# Patient Record
Sex: MALE | Race: WHITE | Employment: UNEMPLOYED | URBAN - METROPOLITAN AREA
[De-identification: names, ages, dates, MRNs, and addresses within clinical notes are randomized per-mention and may not be internally consistent; named-entity substitution may affect disease eponyms.]

---

## 2018-06-11 ENCOUNTER — OFFICE VISIT (OUTPATIENT)
Dept: INTERNAL MEDICINE | Age: 28
End: 2018-06-11
Attending: INTERNAL MEDICINE

## 2018-06-11 ENCOUNTER — OFFICE VISIT (OUTPATIENT)
Dept: INTERNAL MEDICINE | Age: 28
End: 2018-06-11
Attending: PODIATRIST

## 2018-06-11 VITALS
HEIGHT: 72 IN | OXYGEN SATURATION: 97 % | TEMPERATURE: 97.8 F | SYSTOLIC BLOOD PRESSURE: 141 MMHG | BODY MASS INDEX: 32.23 KG/M2 | WEIGHT: 238 LBS | HEART RATE: 105 BPM | DIASTOLIC BLOOD PRESSURE: 85 MMHG

## 2018-06-11 DIAGNOSIS — S92.351D CLOSED DISPLACED FRACTURE OF FIFTH METATARSAL BONE OF RIGHT FOOT WITH ROUTINE HEALING: Primary | ICD-10-CM

## 2018-06-11 DIAGNOSIS — S92.351D CLOSED DISPLACED FRACTURE OF FIFTH METATARSAL BONE OF RIGHT FOOT WITH ROUTINE HEALING: ICD-10-CM

## 2018-06-11 DIAGNOSIS — E78.5 DYSLIPIDEMIA: ICD-10-CM

## 2018-06-11 LAB
A/G RATIO: 1.5 (ref 1.1–2.2)
ALBUMIN SERPL-MCNC: 5 G/DL (ref 3.4–5)
ALP BLD-CCNC: 89 U/L (ref 40–129)
ALT SERPL-CCNC: 36 U/L (ref 10–40)
AMPHETAMINE SCREEN, URINE: ABNORMAL
ANION GAP SERPL CALCULATED.3IONS-SCNC: 16 MMOL/L (ref 3–16)
APTT: 38.9 SEC (ref 26–36)
AST SERPL-CCNC: 23 U/L (ref 15–37)
BARBITURATE SCREEN URINE: ABNORMAL
BENZODIAZEPINE SCREEN, URINE: POSITIVE
BILIRUB SERPL-MCNC: 0.4 MG/DL (ref 0–1)
BUN BLDV-MCNC: 11 MG/DL (ref 7–20)
CALCIUM SERPL-MCNC: 10 MG/DL (ref 8.3–10.6)
CANNABINOID SCREEN URINE: POSITIVE
CHLORIDE BLD-SCNC: 102 MMOL/L (ref 99–110)
CHOLESTEROL, TOTAL: 216 MG/DL (ref 0–199)
CO2: 24 MMOL/L (ref 21–32)
COCAINE METABOLITE SCREEN URINE: ABNORMAL
CREAT SERPL-MCNC: 0.8 MG/DL (ref 0.9–1.3)
GFR AFRICAN AMERICAN: >60
GFR NON-AFRICAN AMERICAN: >60
GLOBULIN: 3.4 G/DL
GLUCOSE BLD-MCNC: 83 MG/DL (ref 70–99)
HCT VFR BLD CALC: 50 % (ref 40.5–52.5)
HDLC SERPL-MCNC: 36 MG/DL (ref 40–60)
HEMOGLOBIN: 16.8 G/DL (ref 13.5–17.5)
INR BLD: 0.9 (ref 0.86–1.14)
LDL CHOLESTEROL CALCULATED: ABNORMAL MG/DL
LDL CHOLESTEROL DIRECT: 140 MG/DL
Lab: ABNORMAL
MCH RBC QN AUTO: 28.9 PG (ref 26–34)
MCHC RBC AUTO-ENTMCNC: 33.7 G/DL (ref 31–36)
MCV RBC AUTO: 85.8 FL (ref 80–100)
METHADONE SCREEN, URINE: ABNORMAL
OPIATE SCREEN URINE: ABNORMAL
OXYCODONE URINE: POSITIVE
PDW BLD-RTO: 12.7 % (ref 12.4–15.4)
PH UA: 6
PHENCYCLIDINE SCREEN URINE: ABNORMAL
PLATELET # BLD: 228 K/UL (ref 135–450)
PMV BLD AUTO: 10 FL (ref 5–10.5)
POTASSIUM SERPL-SCNC: 4.2 MMOL/L (ref 3.5–5.1)
PROPOXYPHENE SCREEN: ABNORMAL
PROTHROMBIN TIME: 10.3 SEC (ref 9.8–13)
RBC # BLD: 5.83 M/UL (ref 4.2–5.9)
SODIUM BLD-SCNC: 142 MMOL/L (ref 136–145)
TOTAL PROTEIN: 8.4 G/DL (ref 6.4–8.2)
TRIGL SERPL-MCNC: 334 MG/DL (ref 0–150)
VLDLC SERPL CALC-MCNC: ABNORMAL MG/DL
WBC # BLD: 9.8 K/UL (ref 4–11)

## 2018-06-11 RX ORDER — IBUPROFEN 800 MG/1
800 TABLET ORAL EVERY 8 HOURS PRN
Qty: 30 TABLET | Refills: 1 | Status: SHIPPED | OUTPATIENT
Start: 2018-06-11 | End: 2018-09-07

## 2018-06-11 RX ORDER — OXYCODONE HYDROCHLORIDE AND ACETAMINOPHEN 5; 325 MG/1; MG/1
1 TABLET ORAL EVERY 8 HOURS PRN
Qty: 20 TABLET | Refills: 0 | Status: SHIPPED | OUTPATIENT
Start: 2018-06-11 | End: 2018-06-15 | Stop reason: HOSPADM

## 2018-06-11 ASSESSMENT — ENCOUNTER SYMPTOMS
RESPIRATORY NEGATIVE: 1
EYES NEGATIVE: 1
GASTROINTESTINAL NEGATIVE: 1

## 2018-06-14 RX ORDER — SODIUM CHLORIDE, SODIUM LACTATE, POTASSIUM CHLORIDE, CALCIUM CHLORIDE 600; 310; 30; 20 MG/100ML; MG/100ML; MG/100ML; MG/100ML
INJECTION, SOLUTION INTRAVENOUS CONTINUOUS
Status: CANCELLED | OUTPATIENT
Start: 2018-06-14

## 2018-06-15 ENCOUNTER — HOSPITAL ENCOUNTER (OUTPATIENT)
Dept: SURGERY | Age: 28
Discharge: HOME | End: 2018-06-15
Attending: PODIATRIST | Admitting: PODIATRIST

## 2018-06-15 VITALS
BODY MASS INDEX: 31.14 KG/M2 | TEMPERATURE: 97.6 F | DIASTOLIC BLOOD PRESSURE: 74 MMHG | WEIGHT: 235 LBS | OXYGEN SATURATION: 98 % | HEART RATE: 82 BPM | RESPIRATION RATE: 16 BRPM | HEIGHT: 73 IN | SYSTOLIC BLOOD PRESSURE: 112 MMHG

## 2018-06-15 DIAGNOSIS — G89.18 POST-OPERATIVE PAIN: Primary | ICD-10-CM

## 2018-06-15 RX ORDER — MIDAZOLAM HYDROCHLORIDE 1 MG/ML
INJECTION INTRAMUSCULAR; INTRAVENOUS
Status: COMPLETED
Start: 2018-06-15 | End: 2018-06-15

## 2018-06-15 RX ORDER — MIDAZOLAM HYDROCHLORIDE 1 MG/ML
2 INJECTION INTRAMUSCULAR; INTRAVENOUS ONCE
Status: COMPLETED | OUTPATIENT
Start: 2018-06-15 | End: 2018-06-15

## 2018-06-15 RX ORDER — FENTANYL CITRATE 50 UG/ML
100 INJECTION, SOLUTION INTRAMUSCULAR; INTRAVENOUS ONCE
Status: COMPLETED | OUTPATIENT
Start: 2018-06-15 | End: 2018-06-15

## 2018-06-15 RX ORDER — ROPIVACAINE HYDROCHLORIDE 5 MG/ML
30 INJECTION, SOLUTION EPIDURAL; INFILTRATION; PERINEURAL ONCE
Status: DISCONTINUED | OUTPATIENT
Start: 2018-06-15 | End: 2018-06-16 | Stop reason: HOSPADM

## 2018-06-15 RX ORDER — OXYCODONE HYDROCHLORIDE AND ACETAMINOPHEN 5; 325 MG/1; MG/1
1 TABLET ORAL EVERY 4 HOURS PRN
Qty: 42 TABLET | Refills: 0 | Status: SHIPPED | OUTPATIENT
Start: 2018-06-15 | End: 2018-06-22

## 2018-06-15 RX ORDER — ASPIRIN 325 MG
325 TABLET ORAL DAILY
Qty: 14 TABLET | Refills: 0 | Status: SHIPPED | OUTPATIENT
Start: 2018-06-15 | End: 2018-06-19 | Stop reason: SDUPTHER

## 2018-06-15 RX ORDER — FENTANYL CITRATE 50 UG/ML
INJECTION, SOLUTION INTRAMUSCULAR; INTRAVENOUS
Status: COMPLETED
Start: 2018-06-15 | End: 2018-06-15

## 2018-06-15 RX ORDER — OXYCODONE HYDROCHLORIDE AND ACETAMINOPHEN 5; 325 MG/1; MG/1
1 TABLET ORAL ONCE
Status: DISCONTINUED | OUTPATIENT
Start: 2018-06-15 | End: 2018-06-16 | Stop reason: HOSPADM

## 2018-06-15 RX ORDER — FENTANYL CITRATE 50 UG/ML
100 INJECTION, SOLUTION INTRAMUSCULAR; INTRAVENOUS ONCE
Status: DISCONTINUED | OUTPATIENT
Start: 2018-06-15 | End: 2018-06-16 | Stop reason: HOSPADM

## 2018-06-15 RX ORDER — SODIUM CHLORIDE, SODIUM LACTATE, POTASSIUM CHLORIDE, CALCIUM CHLORIDE 600; 310; 30; 20 MG/100ML; MG/100ML; MG/100ML; MG/100ML
INJECTION, SOLUTION INTRAVENOUS CONTINUOUS
Status: DISCONTINUED | OUTPATIENT
Start: 2018-06-15 | End: 2018-06-16 | Stop reason: HOSPADM

## 2018-06-15 RX ORDER — ROPIVACAINE HYDROCHLORIDE 5 MG/ML
INJECTION, SOLUTION EPIDURAL; INFILTRATION; PERINEURAL
Status: DISCONTINUED
Start: 2018-06-15 | End: 2018-06-16 | Stop reason: HOSPADM

## 2018-06-15 RX ADMIN — FENTANYL CITRATE 100 MCG: 50 INJECTION, SOLUTION INTRAMUSCULAR; INTRAVENOUS at 13:16

## 2018-06-15 RX ADMIN — MIDAZOLAM HYDROCHLORIDE 2 MG: 1 INJECTION INTRAMUSCULAR; INTRAVENOUS at 13:19

## 2018-06-15 RX ADMIN — SODIUM CHLORIDE, SODIUM LACTATE, POTASSIUM CHLORIDE, CALCIUM CHLORIDE: 600; 310; 30; 20 INJECTION, SOLUTION INTRAVENOUS at 12:07

## 2018-06-15 RX ADMIN — MIDAZOLAM HYDROCHLORIDE 2 MG: 1 INJECTION INTRAMUSCULAR; INTRAVENOUS at 13:17

## 2018-06-15 RX ADMIN — FENTANYL CITRATE 50 MCG: 50 INJECTION, SOLUTION INTRAMUSCULAR; INTRAVENOUS at 12:18

## 2018-06-15 ASSESSMENT — PAIN DESCRIPTION - DESCRIPTORS: DESCRIPTORS: ACHING;CRUSHING;DISCOMFORT;HEAVINESS

## 2018-06-15 ASSESSMENT — PAIN SCALES - GENERAL
PAINLEVEL_OUTOF10: 0
PAINLEVEL_OUTOF10: 6
PAINLEVEL_OUTOF10: 5

## 2018-06-16 RX ORDER — PROMETHAZINE HYDROCHLORIDE 25 MG/1
25 TABLET ORAL EVERY 6 HOURS PRN
Qty: 42 TABLET | Refills: 0 | OUTPATIENT
Start: 2018-06-16 | End: 2018-09-07

## 2018-06-19 ENCOUNTER — OFFICE VISIT (OUTPATIENT)
Dept: INTERNAL MEDICINE | Age: 28
End: 2018-06-19
Attending: PODIATRIST

## 2018-06-19 DIAGNOSIS — S92.355S CLOSED NONDISPLACED FRACTURE OF FIFTH METATARSAL BONE OF LEFT FOOT, SEQUELA: Primary | ICD-10-CM

## 2018-06-19 RX ORDER — ASPIRIN 325 MG
325 TABLET ORAL DAILY
Qty: 14 TABLET | Refills: 0 | Status: SHIPPED | OUTPATIENT
Start: 2018-06-19 | End: 2018-09-07

## 2018-06-20 ENCOUNTER — TELEPHONE (OUTPATIENT)
Dept: INTERNAL MEDICINE | Age: 28
End: 2018-06-20

## 2018-06-22 ENCOUNTER — OFFICE VISIT (OUTPATIENT)
Dept: INTERNAL MEDICINE | Age: 28
End: 2018-06-22
Attending: PODIATRIST

## 2018-06-22 DIAGNOSIS — M79.673 PAIN OF FOOT, UNSPECIFIED LATERALITY: Primary | ICD-10-CM

## 2018-07-13 ENCOUNTER — OFFICE VISIT (OUTPATIENT)
Dept: INTERNAL MEDICINE | Age: 28
End: 2018-07-13
Attending: PODIATRIST

## 2018-07-13 DIAGNOSIS — S92.351D CLOSED DISPLACED FRACTURE OF FIFTH METATARSAL BONE OF RIGHT FOOT WITH ROUTINE HEALING: Primary | ICD-10-CM

## 2018-07-20 ENCOUNTER — OFFICE VISIT (OUTPATIENT)
Dept: INTERNAL MEDICINE CLINIC | Age: 28
End: 2018-07-20
Payer: MEDICARE

## 2018-07-20 ENCOUNTER — HOSPITAL ENCOUNTER (OUTPATIENT)
Age: 28
Discharge: HOME | End: 2018-07-20
Payer: MEDICARE

## 2018-07-20 ENCOUNTER — HOSPITAL ENCOUNTER (OUTPATIENT)
Dept: GENERAL RADIOLOGY | Age: 28
Discharge: HOME | End: 2018-07-20
Payer: MEDICARE

## 2018-07-20 DIAGNOSIS — S92.351D CLOSED DISPLACED FRACTURE OF FIFTH METATARSAL BONE OF RIGHT FOOT WITH ROUTINE HEALING: Primary | ICD-10-CM

## 2018-07-20 DIAGNOSIS — S92.355S CLOSED NONDISPLACED FRACTURE OF FIFTH METATARSAL BONE OF LEFT FOOT, SEQUELA: ICD-10-CM

## 2018-07-20 PROCEDURE — 73630 X-RAY EXAM OF FOOT: CPT

## 2018-07-20 PROCEDURE — 99213 OFFICE O/P EST LOW 20 MIN: CPT | Performed by: STUDENT IN AN ORGANIZED HEALTH CARE EDUCATION/TRAINING PROGRAM

## 2018-07-20 NOTE — PROGRESS NOTES
the next week, followed by full weightbearing in the CAM boot. Explained to patient that he may continue using the crutches for extra support/balance.    - Patient to RTC in 2 weeks    Chery Heaton PGY1  Pager [phone]

## 2018-08-03 ENCOUNTER — OFFICE VISIT (OUTPATIENT)
Dept: INTERNAL MEDICINE CLINIC | Age: 28
End: 2018-08-03
Payer: COMMERCIAL

## 2018-08-03 DIAGNOSIS — S92.351D CLOSED DISPLACED FRACTURE OF FIFTH METATARSAL BONE OF RIGHT FOOT WITH ROUTINE HEALING: Primary | ICD-10-CM

## 2018-08-03 PROCEDURE — 99213 OFFICE O/P EST LOW 20 MIN: CPT | Performed by: STUDENT IN AN ORGANIZED HEALTH CARE EDUCATION/TRAINING PROGRAM

## 2018-08-03 NOTE — PROGRESS NOTES
Department of Podiatry  Resident Progress Note    Hattie Murphy  Allergies: Patient has no known allergies. SUBJECTIVE  The patient is a 24 y.o. male who presents with s/p ORIF 5th met fx of right foot (DOS 6/15). Patient states he has been transitioning to partial weight bearing in his CAM boot. Patient states that after his last appointment 2 weeks ago, he continued to use the crutches for 1 week, and then transitioned to partial heel weight bearing over the next week. Patient admits to putting full weight on it for the past couple days with minimal pain. Patient has been doing passive range of motion exercises every night since his last appointment. Patient states he received x-rays prior to his appointment today. Patient denies f/c/n/v/sob/cp and denies any calf pain. Past Medical History:        Diagnosis Date    Hyperlipidemia        REVIEW OF SYSTEMS:  All 10 point ROS negative unless stated otherwise. OBJECTIVE: RIGHT FOOT FOCUSED EXAM   General: Patient is alert and oriented x3. No signs of distress  noted. Vascular: DP/PT pulses palpable. CRF within normal limits. Moderate swelling present. Skin temp warm to cool from proximal to distal within normal limits. No varicosities present. Neurological: Gross light touch sensation appreciated and intact at all pedal sites    Dermatologic: Skin intact with no ecchymosis. No open lesions, drainage, erythema, or signs of acute infection. Incision site intact with no dehiscence. Musculoskeletal: Muscle strength 5/5. Mild POP along incision site and distal to incision. Mild pain with active eversion and dorsiflexion on the right     IMAGING     X-RAY, Right foot 7/20/18:   Impression       Status post ORIF of the fifth metatarsal base avulsion type   fracture. Partial osseous union at the fracture site.        ASSESSMENT  1.s/p 5th met fracture, right    PLAN  - Evaluation and management x 15 minutes and greater than 50% of the time

## 2018-08-17 ENCOUNTER — OFFICE VISIT (OUTPATIENT)
Dept: INTERNAL MEDICINE CLINIC | Age: 28
End: 2018-08-17
Payer: MEDICARE

## 2018-08-17 ENCOUNTER — HOSPITAL ENCOUNTER (OUTPATIENT)
Dept: GENERAL RADIOLOGY | Age: 28
Discharge: HOME | End: 2018-08-17
Payer: MEDICARE

## 2018-08-17 ENCOUNTER — HOSPITAL ENCOUNTER (OUTPATIENT)
Age: 28
Discharge: HOME | End: 2018-08-17
Payer: MEDICARE

## 2018-08-17 DIAGNOSIS — S92.351D CLOSED DISPLACED FRACTURE OF FIFTH METATARSAL BONE OF RIGHT FOOT WITH ROUTINE HEALING: Primary | ICD-10-CM

## 2018-08-17 DIAGNOSIS — S92.351D CLOSED DISPLACED FRACTURE OF FIFTH METATARSAL BONE OF RIGHT FOOT WITH ROUTINE HEALING: ICD-10-CM

## 2018-08-17 PROCEDURE — 99213 OFFICE O/P EST LOW 20 MIN: CPT | Performed by: PODIATRIST

## 2018-08-17 PROCEDURE — 73630 X-RAY EXAM OF FOOT: CPT

## 2018-08-17 NOTE — PROGRESS NOTES
prior to next visit. Instructed patient to get xrays prior to his next visit. - Instructed patient to remain in the CAM walker full weightbearing   - Patient to continue with ROM exercises   - Patient to RTC in 2 weeks at which point we will transition into full weight bearing in tennis shoe.      Daryel Duane, PGY-3  Pager: [phone]

## 2018-08-31 ENCOUNTER — HOSPITAL ENCOUNTER (OUTPATIENT)
Age: 28
Discharge: HOME | End: 2018-08-31
Payer: MEDICARE

## 2018-08-31 ENCOUNTER — OFFICE VISIT (OUTPATIENT)
Dept: INTERNAL MEDICINE CLINIC | Age: 28
End: 2018-08-31
Payer: MEDICARE

## 2018-08-31 ENCOUNTER — HOSPITAL ENCOUNTER (OUTPATIENT)
Dept: GENERAL RADIOLOGY | Age: 28
Discharge: HOME | End: 2018-08-31
Payer: MEDICARE

## 2018-08-31 DIAGNOSIS — S92.351D CLOSED DISPLACED FRACTURE OF FIFTH METATARSAL BONE OF RIGHT FOOT WITH ROUTINE HEALING: Primary | ICD-10-CM

## 2018-08-31 DIAGNOSIS — S92.351D CLOSED DISPLACED FRACTURE OF FIFTH METATARSAL BONE OF RIGHT FOOT WITH ROUTINE HEALING: ICD-10-CM

## 2018-08-31 PROCEDURE — 73630 X-RAY EXAM OF FOOT: CPT

## 2018-08-31 PROCEDURE — 99213 OFFICE O/P EST LOW 20 MIN: CPT | Performed by: STUDENT IN AN ORGANIZED HEALTH CARE EDUCATION/TRAINING PROGRAM

## 2018-08-31 RX ORDER — MELOXICAM 7.5 MG/1
7.5 TABLET ORAL DAILY
Qty: 14 TABLET | Refills: 0 | Status: SHIPPED | OUTPATIENT
Start: 2018-08-31 | End: 2018-09-07

## 2018-08-31 NOTE — PATIENT INSTRUCTIONS
asthma attack or severe allergic reaction after taking aspirin or an NSAID. To make sure meloxicam is safe for you, tell your doctor if you have:  · heart disease, high blood pressure, high cholesterol, diabetes, or if you smoke;  · a history of heart attack, stroke, or blood clot;  · a history of stomach ulcers or bleeding;  · asthma;  · kidney disease (or if you are on dialysis);  · liver disease; or  · fluid retention. Taking meloxicam during the last 3 months of pregnancy may harm the unborn baby. Tell your doctor if you are pregnant or plan to become pregnant. Meloxicam may cause a delay in ovulation (the release of an egg from an ovary). You should not take meloxicam if you are undergoing fertility treatment, or are otherwise trying to get pregnant. Meloxicam can pass into breast milk and may harm a nursing baby. You should not breast-feed while using this medicine. Meloxicam is not approved for use by anyone younger than 3years old. How should I take meloxicam?  Follow all directions on your prescription label. Your doctor may occasionally change your dose to make sure you get the best results. Do not take this medicine in larger amounts or for longer than recommended. Use the lowest dose that is effective in treating your condition. You may take meloxicam with or without food. Shake the oral suspension (liquid) well just before you measure a dose. Measure liquid medicine with the dosing syringe provided, or with a special dose-measuring spoon or medicine cup. If you do not have a dose-measuring device, ask your pharmacist for one. If a child is taking this medication, tell your doctor if the child has any changes in weight. Meloxicam doses are based on weight in children. If you use this medicine long-term, you may need frequent medical tests. Store at room temperature, away from moisture and heat. Keep the bottle tightly closed when not in use.   Read all patient information, medication guides, and instruction sheets provided to you. Ask your doctor or pharmacist if you have any questions. What happens if I miss a dose? Take the missed dose as soon as you remember. Skip the missed dose if it is almost time for your next scheduled dose. Do not take extra medicine to make up the missed dose. What happens if I overdose? Seek emergency medical attention or call the Poison Help line at 1-958.886.4671. What should I avoid while taking meloxicam?  Avoid drinking alcohol. It may increase your risk of stomach bleeding. Avoid taking aspirin while you are taking meloxicam.  Ask a doctor or pharmacist before using any cold, allergy, or pain medication. Many medicines available over the counter contain aspirin or other medicines similar to meloxicam. Taking certain products together can cause you to get too much of this type of medication. Check the label to see if a medicine contains aspirin, ibuprofen, ketoprofen, or naproxen. What are the possible side effects of meloxicam?  Get emergency medical help if you have signs of an allergic reaction: sneezing, runny or stuffy nose; wheezing or trouble breathing; hives; swelling of your face, lips, tongue, or throat. Get emergency medical help if you have signs of a heart attack or stroke: chest pain spreading to your jaw or shoulder, sudden numbness or weakness on one side of the body, slurred speech, feeling short of breath. Stop using meloxicam and call your doctor at once if you have:  · the first sign of any skin rash, no matter how mild;  · shortness of breath (even with mild exertion);   · swelling or rapid weight gain;  · signs of stomach bleeding --bloody or tarry stools, coughing up blood or vomit that looks like coffee grounds;  · liver problems --nausea, upper stomach pain, itching, tired feeling, flu-like symptoms, loss of appetite, dark urine, dionisio-colored stools, jaundice (yellowing of the skin or eyes);  · kidney problems --little or no urinating, painful or difficult urination, swelling in your feet or ankles, feeling tired or short of breath;  · low red blood cells (anemia) --pale skin, feeling light-headed or short of breath, rapid heart rate, trouble concentrating; or  · severe skin reaction --fever, sore throat, swelling in your face or tongue, burning in your eyes, skin pain followed by a red or purple skin rash that spreads (especially in the face or upper body) and causes blistering and peeling. Common side effects may include:  · upset stomach, nausea, vomiting, heartburn;  · diarrhea, constipation, gas;  · dizziness; or  · cold symptoms, flu symptoms. This is not a complete list of side effects and others may occur. Call your doctor for medical advice about side effects. You may report side effects to FDA at 1-976-FDA-9804. What other drugs will affect meloxicam?  Ask your doctor before using meloxicam if you take an antidepressant such as citalopram, escitalopram, fluoxetine (Prozac), fluvoxamine, paroxetine, sertraline (Zoloft), trazodone, or vilazodone. Taking any of these medicines with an NSAID may cause you to bruise or bleed easily. Tell your doctor about all your current medicines and any you start or stop using, especially:  · cyclosporine;  · lithium;  · methotrexate;  · sodium polystyrene sulfonate (Kayexalate);  · a blood thinner (warfarin, Coumadin, Jantoven);  · heart or blood pressure medication, including a diuretic or \"water pill\"; or  · steroid medicine (such as prednisone). This list is not complete. Other drugs may interact with meloxicam, including prescription and over-the-counter medicines, vitamins, and herbal products. Not all possible interactions are listed in this medication guide. Where can I get more information?   Your pharmacist can provide more information about meloxicam.    Remember, keep this and all other medicines out of the reach of children, never share your medicines with others, and use this medication

## 2018-08-31 NOTE — PROGRESS NOTES
OUTPATIENT SPECIALTY PODIATRY VISIT      Nursing Progress Note      August 31, 2018  Karthikeyan Morris    Patient description of the problem: follow up on xray . Having pain . He is back in a cam boot. States had xray.     Observations: as above    Ambulates: without assistance    Gait: Normal     Assistive Devices: none    Fall History: No    Foot Hygiene: good    Foot Wear Proper: Yes    Impaired Skin Integrity: No     Pain Assessment:  Pain Present: yes  Pain Score: can go up to level 10 at times/10  Pain Quality/Description: Burning and Throbbing  Pain Onset: 1 weeks ago  Pain Goal of patient: 0/10    Education Assessment:    Identify the learner who is being assessed for education:  patient                    Ability to Learn:  Exhibits ability to grasp concepts and respond to questions: Medium  Ready to Learn: Yes  calm   Preferred Method of Learning:  written  Barriers to Learning: Verbalizes interest  Special Considerations due to cultural, Tenriism, spiritual beliefs:  No  Language:  English  :  No    Lito Douglas Page  1:19 PM 8/31/2018            2
with patient. Impressions states above. - Explained to patient that the screw/fracture are at an area of muscle and ligamentous attachments and that the firing of those muscles while ambulating may be causing his pain and discomfort. Also explained to patient that since he has been non-weight bearing and partial weight bearing for multiple months, his leg muscles have atrophied compared to the left and are weaker, which may be causing him as well. Offered to send patient to physical therapy and encouraged it since he denied it at a previous appointment.  Patient agreed to physical therapy  - Consult for physical therapy   - Instructed patient to continue wearing the CAM boot until he sees physical therapy and to f/u with their recommendations based on his first session   - Patient to continue with ROM exercises   - Patient to RTC in 2 weeks     Rocael Vega PGY1  Pager [phone]

## 2018-09-07 ENCOUNTER — HOSPITAL ENCOUNTER (EMERGENCY)
Age: 28
Discharge: HOME | End: 2018-09-07
Attending: EMERGENCY MEDICINE
Payer: MEDICARE

## 2018-09-07 VITALS
HEART RATE: 88 BPM | HEIGHT: 72 IN | DIASTOLIC BLOOD PRESSURE: 86 MMHG | RESPIRATION RATE: 16 BRPM | SYSTOLIC BLOOD PRESSURE: 142 MMHG | BODY MASS INDEX: 31.8 KG/M2 | TEMPERATURE: 97.9 F | WEIGHT: 234.8 LBS

## 2018-09-07 DIAGNOSIS — J20.9 ACUTE BRONCHITIS, UNSPECIFIED ORGANISM: Primary | ICD-10-CM

## 2018-09-07 DIAGNOSIS — R03.0 ELEVATED BLOOD PRESSURE READING: ICD-10-CM

## 2018-09-07 DIAGNOSIS — R05.9 COUGH: ICD-10-CM

## 2018-09-07 PROCEDURE — 99282 EMERGENCY DEPT VISIT SF MDM: CPT

## 2018-09-07 RX ORDER — BENZONATATE 200 MG/1
200 CAPSULE ORAL 3 TIMES DAILY PRN
Qty: 21 CAPSULE | Refills: 1 | Status: SHIPPED | OUTPATIENT
Start: 2018-09-07 | End: 2019-01-07 | Stop reason: ALTCHOICE

## 2018-09-07 RX ORDER — AZITHROMYCIN 250 MG/1
TABLET, FILM COATED ORAL
Qty: 6 TABLET | Refills: 0 | Status: SHIPPED | OUTPATIENT
Start: 2018-09-07 | End: 2018-09-17

## 2018-09-12 ENCOUNTER — HOSPITAL ENCOUNTER (OUTPATIENT)
Dept: PHYSICAL THERAPY | Age: 28
Setting detail: THERAPIES SERIES
Discharge: HOME | End: 2018-09-12
Payer: MEDICARE

## 2018-09-12 PROCEDURE — 97110 THERAPEUTIC EXERCISES: CPT

## 2018-09-12 PROCEDURE — G8979 MOBILITY GOAL STATUS: HCPCS

## 2018-09-12 PROCEDURE — G8978 MOBILITY CURRENT STATUS: HCPCS

## 2018-09-12 PROCEDURE — 97161 PT EVAL LOW COMPLEX 20 MIN: CPT

## 2018-09-12 NOTE — FLOWSHEET NOTE
family visits. Pt currently driving, pt weaning off boot has been out of boot for about 2 weeks, pt reports weaned off himself, did not use crufches and has not used crutches. Pt reports having help as needed at home, reports stairs flight of stairs in home to access all needs. Pt reports inc difficulty wiht stairs as well. Objective:  Observation:   Test measurements:  AROM long sitting in Right foot DF 5 degrees with pain and tight end feel, PF 40 degrees with tight end feel, Eversion 20 degrees, Inversion wiht pain 10 degrees, empty end feel. Great toe extension and flexion is limited due to weakness. MMT 2+/5 ankle, knee fair plus to good/normal, hip good to normal.     Exercises:  Exercise 1: per HEP in media isometrics, toe curl, self massage, standing gastroc and soleus stretch poor petra completed in sitting with towel, home modalities rec and avoid high impact exercises or activities, gait training use crutch(s) as needed. Exercise/Equipment Resistance/Repetitions Other comments                                                                           Other Therapeutic Activities:      Home Exercise Program:  Exercise 1: per HEP in media isometrics, toe curl, self massage, standing gastroc and soleus stretch poor petra completed in sitting with towel, home modalities rec and avoid high impact exercises or activities, gait training use crutch(s) as needed. Manual Treatments:      Modalities:      Timed Code Treatment Minutes:       Total Treatment Minutes:      Individual   Time In 1135   Time Out 1215 (15 minutes therapueutic exercise, 25 minutes)   Minutes 40       Treatment/Activity Tolerance:  [] Patient tolerated treatment well [] Patient limited by fatigue  [] Patient limited by pain  [] Patient limited by other medical complications  [] Other:     Assessment:       Prognosis: [] Good [] Fair  [] Poor    Patient Requires Follow-up: [] Yes  [] No    Goals:  Short term goals  Time Frame for Short

## 2018-09-12 NOTE — PLAN OF CARE
pain/tenderness/paresthesias, reducing swelling/inflammation/tightness, improving soft tissue extensibility, and/or to increase muscle tone/strength):     [] Ultrasound  [] Electrical Stimulation        [] Cervical Traction [] Lumbar Traction    ? [x] Cold/hotpack [] Iontophoresis   Other:      []          []      Assessment:  Conditions Requiring Skilled Therapeutic Intervention  Body structures, Functions, Activity limitations: Decreased functional mobility , Decreased sensation, Decreased ADL status, Decreased ROM, Decreased balance, Decreased strength, Decreased safe awareness, Decreased high-level IADLs  Assessment: Pt s/p ORIF 5th metatarsal on the right foot after injury 6/15/18.  Pt c/c pain numbness gait difficulty   Treatment Diagnosis: gait difficulty and right foot back 2/2 ORIF of the 5th metatarsal of the right foot  Prognosis: Good  Decision Making: Low Complexity  Patient Education: roles and goals of PT, home modalities, gait training, HEP instruction, POC- pt agrees and all questions answered   REQUIRES PT FOLLOW UP: Yes  Treatment Initiated : YES    Goals:  Short term goals  Time Frame for Short term goals: 3 weeks  Short term goal 1: Pt to improve AROM in ankle to symmetrical painfree range for ease and improvment of gait mechanics  Short term goal 2: Pt to improveRLE strength to good for ease of transfers and gait   Short term goal 3: Pt to report pain at worse 3-4/10, best 0/10 and without inc pain during normal daily routines or HEP  Short term goal 4: Pt to be indep with HEP and home modalities to max rehab potential  Short term goal 5: Pt to dec impairment per LEFS per pt specific outcome quetsions ( omitted 16-19) from 70% to less than  45%   Long term goals  Time Frame for Long term goals : 6 weeks  Long term goal 1: Pt to report return to normal routines and with less impairment per LEFS pt specific questions less than 20%     Frequency/Duration: Pt wishing to f/u with PT visits in 2-3

## 2018-09-12 NOTE — PROGRESS NOTES
degrees with tight end feel, Eversion 20 degrees, Inversion wiht pain 10 degrees, empty end feel. Great toe extension and flexion is limited due to weakness. MMT 2+/5 ankle, knee fair plus to good/normal, hip good to normal.   Sensation  Overall Sensation Status:  (reports numbness from incision to the end of the pinky toe)  Bed mobility  Bridging: Independent  Rolling to Left: Independent  Rolling to Right: Independent  Supine to Sit: Independent  Sit to Supine: Independent  Scooting: Independent  Transfers  Sit to Stand: Independent  Stand to sit: Independent  Bed to Chair: Independent  Ambulation  Ambulation?: Yes  Ambulation 1  Surface: level tile  Device: No Device  Assistance: Independent  Quality of Gait: mod to mild antalgic gait, asymmetrical step length and stance time, RLE -dec DF at heel strike, dec heel toe off   Distance: 150ft x2   Balance  Comments: unable to complete modified tandem without pain cruz, unable to attempt due to pain SLS  Exercises  Exercise 1: per HEP in media isometrics, toe curl, self massage, standing gastroc and soleus stretch poor petra completed in sitting with towel, home modalities rec and avoid high impact exercises or activities, gait training use crutch(s) as needed. Assessment   Conditions Requiring Skilled Therapeutic Intervention  Body structures, Functions, Activity limitations: Decreased functional mobility ; Decreased sensation;Decreased ADL status; Decreased ROM; Decreased balance;Decreased strength;Decreased safe awareness;Decreased high-level IADLs  Assessment: Pt s/p ORIF 5th metatarsal on the right foot after injury 6/15/18.  Pt c/c pain numbness gait difficulty   Treatment Diagnosis: gait difficulty and right foot back 2/2 ORIF of the 5th metatarsal of the right foot  Prognosis: Good  Decision Making: Low Complexity  Patient Education: roles and goals of PT, home modalities, gait training, HEP instruction, POC- pt agrees and all questions answered   REQUIRES PT FOLLOW UP: Yes  Treatment Initiated : YES  Activity Tolerance  Activity Tolerance: Patient Tolerated treatment well;Patient limited by pain     Plan   Plan  Times per week: 1x a week for 6 weeks  Specific instructions for Next Treatment: Pt stating only wishing to come every 2-3 weeks as needed, pt reported he will f/u with PT visits. Current Treatment Recommendations: Strengthening, Neuromuscular Re-education, IADL Training, Home Exercise Program, Safety Education & Training, Aquatics, Manual Therapy - Soft Tissue Mobilization, ROM, Patient/Caregiver Education & Training, Balance Training, Functional Mobility Training, Equipment Evaluation, Education, & procurement, Modalities, Transfer Training, ADL/Self-care Training, Stair training, Gait Training, Positioning, Pain Management    G-Code  PT G-Codes  Functional Assessment Tool Used: LEFS  Score: 70% impairment ( pt also verbalizing this being correct and 70% from baseline). Functional Limitation: Mobility: Walking and moving around  Mobility: Walking and Moving Around Current Status (): At least 60 percent but less than 80 percent impaired, limited or restricted  Mobility: Walking and Moving Around Goal Status ():  At least 1 percent but less than 20 percent impaired, limited or restricted    Goals  Short term goals  Time Frame for Short term goals: 3 weeks  Short term goal 1: Pt to improve AROM in ankle to symmetrical painfree range for ease and improvment of gait mechanics  Short term goal 2: Pt to improveRLE strength to good for ease of transfers and gait   Short term goal 3: Pt to report pain at worse 3-4/10, best 0/10 and without inc pain during normal daily routines or HEP  Short term goal 4: Pt to be indep with HEP and home modalities to max rehab potential  Short term goal 5: Pt to dec impairment per LEFS per pt specific outcome quetsions ( omitted 16-19) from 70% to less than  45%   Long term goals  Time Frame for Long term goals : 6 weeks  Long term goal 1: Pt to report return to normal routines and with less impairment per LEFS pt specific questions less than 20%   Patient Goals   Patient goals : \"normal function of my leg\" back to baseline     Therapy Time   Individual   Time In 1135   Time Out 1215 (15 minutes therapueutic exercise, 25 minutes)   Minutes 40     Arun Mendez, PT DPT

## 2018-10-02 NOTE — DISCHARGE SUMMARY
further PT visits after evaluation but has not. Pt was given education over modalities, ROM/strengthening for HEP instruction. Pt now d/c'd due to lack of compliance with rec POC. Rehab Potential: [] Excellent [x] Good [] Fair  [] Poor     Goal Status:  Unable to reassess     Patient Status: [] Continue per initial plan of Care     [x] Patient now discharged     [] Additional visits requested, Please re-certify for additional visits:      Requested frequency/duration:  X/week for weeks    Electronically signed by:  Marlena Mtz PT DPT    If you have any questions or concerns, please don't hesitate to call.   Thank you for your referral.    Physician Signature:________________________________Date:__________________  By signing above, therapists plan is approved by physician

## 2019-01-07 ENCOUNTER — HOSPITAL ENCOUNTER (EMERGENCY)
Age: 29
Discharge: HOME | End: 2019-01-07
Attending: EMERGENCY MEDICINE
Payer: COMMERCIAL

## 2019-01-07 VITALS
HEIGHT: 72 IN | BODY MASS INDEX: 31.26 KG/M2 | HEART RATE: 96 BPM | TEMPERATURE: 98.2 F | RESPIRATION RATE: 16 BRPM | DIASTOLIC BLOOD PRESSURE: 86 MMHG | OXYGEN SATURATION: 97 % | SYSTOLIC BLOOD PRESSURE: 144 MMHG | WEIGHT: 230.8 LBS

## 2019-01-07 DIAGNOSIS — K08.89 DENTALGIA: Primary | ICD-10-CM

## 2019-01-07 DIAGNOSIS — R03.0 ELEVATED BLOOD PRESSURE READING: ICD-10-CM

## 2019-01-07 PROCEDURE — 99282 EMERGENCY DEPT VISIT SF MDM: CPT

## 2019-01-07 RX ORDER — IBUPROFEN 600 MG/1
600 TABLET ORAL EVERY 8 HOURS PRN
Qty: 20 TABLET | Refills: 1 | Status: SHIPPED | OUTPATIENT
Start: 2019-01-07

## 2019-01-07 RX ORDER — PENICILLIN V POTASSIUM 500 MG/1
500 TABLET ORAL 4 TIMES DAILY
Qty: 40 TABLET | Refills: 0 | Status: SHIPPED | OUTPATIENT
Start: 2019-01-07 | End: 2019-01-17

## 2019-01-07 ASSESSMENT — PAIN DESCRIPTION - LOCATION: LOCATION: TEETH

## 2019-01-07 ASSESSMENT — PAIN DESCRIPTION - PAIN TYPE: TYPE: ACUTE PAIN

## 2019-01-07 ASSESSMENT — PAIN DESCRIPTION - FREQUENCY: FREQUENCY: CONTINUOUS

## 2019-01-07 ASSESSMENT — PAIN DESCRIPTION - DESCRIPTORS: DESCRIPTORS: THROBBING

## 2019-01-07 ASSESSMENT — PAIN SCALES - GENERAL: PAINLEVEL_OUTOF10: 9

## 2019-05-22 ENCOUNTER — HOSPITAL ENCOUNTER (EMERGENCY)
Age: 29
Discharge: HOME | End: 2019-05-22
Attending: EMERGENCY MEDICINE
Payer: COMMERCIAL

## 2019-05-22 VITALS
DIASTOLIC BLOOD PRESSURE: 96 MMHG | TEMPERATURE: 98.5 F | HEART RATE: 100 BPM | WEIGHT: 221.6 LBS | BODY MASS INDEX: 29.37 KG/M2 | RESPIRATION RATE: 12 BRPM | SYSTOLIC BLOOD PRESSURE: 142 MMHG | HEIGHT: 73 IN

## 2019-05-22 DIAGNOSIS — R03.0 ELEVATED BLOOD PRESSURE READING: Primary | ICD-10-CM

## 2019-05-22 DIAGNOSIS — S61.012A LACERATION OF LEFT THUMB WITHOUT FOREIGN BODY WITHOUT DAMAGE TO NAIL, INITIAL ENCOUNTER: ICD-10-CM

## 2019-05-22 PROCEDURE — 90715 TDAP VACCINE 7 YRS/> IM: CPT | Performed by: EMERGENCY MEDICINE

## 2019-05-22 PROCEDURE — 4500000022 HC ED LEVEL 2 PROCEDURE

## 2019-05-22 PROCEDURE — 90471 IMMUNIZATION ADMIN: CPT | Performed by: EMERGENCY MEDICINE

## 2019-05-22 PROCEDURE — 6360000002 HC RX W HCPCS: Performed by: EMERGENCY MEDICINE

## 2019-05-22 PROCEDURE — 99282 EMERGENCY DEPT VISIT SF MDM: CPT

## 2019-05-22 PROCEDURE — 2500000003 HC RX 250 WO HCPCS: Performed by: EMERGENCY MEDICINE

## 2019-05-22 RX ORDER — LIDOCAINE HYDROCHLORIDE 20 MG/ML
5 INJECTION, SOLUTION INFILTRATION; PERINEURAL ONCE
Status: COMPLETED | OUTPATIENT
Start: 2019-05-22 | End: 2019-05-22

## 2019-05-22 RX ADMIN — TETANUS TOXOID, REDUCED DIPHTHERIA TOXOID AND ACELLULAR PERTUSSIS VACCINE, ADSORBED 0.5 ML: 5; 2.5; 8; 8; 2.5 SUSPENSION INTRAMUSCULAR at 12:13

## 2019-05-22 RX ADMIN — LIDOCAINE HYDROCHLORIDE 5 ML: 20 INJECTION, SOLUTION INFILTRATION; PERINEURAL at 12:15

## 2019-05-22 ASSESSMENT — PAIN DESCRIPTION - LOCATION: LOCATION: HAND

## 2019-05-22 ASSESSMENT — PAIN SCALES - GENERAL
PAINLEVEL_OUTOF10: 7
PAINLEVEL_OUTOF10: 7

## 2019-05-22 ASSESSMENT — PAIN DESCRIPTION - ORIENTATION: ORIENTATION: LEFT

## 2019-05-22 NOTE — ED NOTES
Pt states understanding of d/c instructions. Splint applied by RT. Pt alert and oriented with steady gait upon discharge.       Tung Aggarwal, ABDIEL  05/22/19 Charan Sanford 81., RN  05/22/19 [id number]

## 2019-05-22 NOTE — ED NOTES
Cleaned laceration with Hibiclens and sodium chloride. Pt. tolerated well.       Aileen Chen, P  05/22/19 [id number]

## 2019-05-22 NOTE — ED PROVIDER NOTES
CHIEF COMPLAINT  Laceration      HISTORY OF PRESENT ILLNESS  Ruy Hay is a 34 y.o. male, who presents to the ED with a laceration to his left thumb after accidentally cutting it on a razor knife which was in his toolkit as he was looking for a tool. No numbness weakness or paresthesias. No other complaints,modifying factors or associated symptoms. Review of Systems    I have reviewed the following from the nursing documentation. Past Medical History:   Diagnosis Date    Hyperlipidemia      Past Surgical History:   Procedure Laterality Date    FOOT SURGERY Right 06/15/2018    OPEN REDUCTION INTERNAL FIXATION RIGHT FOOT    HERNIA REPAIR       History reviewed. No pertinent family history.   Social History     Socioeconomic History    Marital status: Single     Spouse name: Not on file    Number of children: Not on file    Years of education: Not on file    Highest education level: Not on file   Occupational History    Not on file   Social Needs    Financial resource strain: Not on file    Food insecurity:     Worry: Not on file     Inability: Not on file    Transportation needs:     Medical: Not on file     Non-medical: Not on file   Tobacco Use    Smoking status: Current Every Day Smoker     Packs/day: 1.00     Types: Cigarettes    Smokeless tobacco: Never Used   Substance and Sexual Activity    Alcohol use: No     Comment: social    Drug use: Yes     Types: Marijuana    Sexual activity: Not on file   Lifestyle    Physical activity:     Days per week: Not on file     Minutes per session: Not on file    Stress: Not on file   Relationships    Social connections:     Talks on phone: Not on file     Gets together: Not on file     Attends Mandaeism service: Not on file     Active member of club or organization: Not on file     Attends meetings of clubs or organizations: Not on file     Relationship status: Not on file    Intimate partner violence:     Fear of current or ex partner: Not on file     Emotionally abused: Not on file     Physically abused: Not on file     Forced sexual activity: Not on file   Other Topics Concern    Not on file   Social History Narrative    Not on file        No current facility-administered medications for this encounter. Current Outpatient Medications   Medication Sig Dispense Refill    ibuprofen (IBU) 600 MG tablet Take 1 tablet by mouth every 8 hours as needed for Pain or Fever (with food) 20 tablet 1     No Known Allergies  [unfilled]       PHYSICAL EXAM  BP (!) 142/96   Pulse 100   Temp 98.5 °F (36.9 °C) (Oral)   Resp 12   Ht 6' 1\" (1.854 m)   Wt 100.5 kg (221 lb 9.6 oz)   BMI 29.24 kg/m²   GENERAL APPEARANCE: Awake and alert. Cooperative. In no acute distress. EXTREMITIES:  Examination of the left thumb shows an oblique laceration on the radial side of the interphalangeal joint 1.7 centimeter in length full strength and range of motion no apparent tendon or joint injury sensation is intact capillary refill is normal   SKIN: Warm and dry. Physical Exam    LABORATORY STUDIES:  Labs Reviewed - No data to display     RADIOLOGY  No orders to display       PROCEDURES  Lac Repair  Date/Time: 5/22/2019 1:26 PM  Performed by: Kip Serra MD  Authorized by: Kip Serra MD     Consent:     Consent given by:  Patient    Risks discussed:  Infection, pain, poor cosmetic result, need for additional repair, nerve damage, poor wound healing, vascular damage and tendon damage  Anesthesia (see MAR for exact dosages):      Anesthesia method:  Local infiltration    Local anesthetic:  Lidocaine 2% w/o epi  Laceration details:     Location:  Finger    Finger location:  L thumb    Length (cm):  1.7    Depth (mm):  2  Repair type:     Repair type:  Simple  Pre-procedure details:     Preparation:  Patient was prepped and draped in usual sterile fashion  Exploration:     Hemostasis achieved with:  Direct pressure and tourniquet    Wound exploration: entire depth of wound probed and visualized      Wound extent: areolar tissue violated      Wound extent: no fascia violation noted, no foreign bodies/material noted, no muscle damage noted, no nerve damage noted, no tendon damage noted, no underlying fracture noted and no vascular damage noted    Treatment:     Area cleansed with:  Betadine and Hibiclens    Amount of cleaning:  Standard    Irrigation solution:  Sterile saline    Irrigation volume:  250    Irrigation method:  Syringe  Skin repair:     Repair method:  Sutures    Suture size:  4-0    Suture material:  Nylon    Suture technique:  Simple interrupted    Number of sutures:  3  Approximation:     Approximation:  Close  Post-procedure details:     Dressing:  Sterile dressing and splint for protection    Patient tolerance of procedure: Tolerated well, no immediate complications        ED COURSE/MDM  Patient seen and evaluated. Old records reviewed if pertinent. Labs and imaging reviewed and results discussed withpatient. I considered laceration with no apparent joint tendon or nerve injury. Plan of care discussed with patient or family as appropriate. Patient or family in agreement with plan. If discharged, patient was given scripts for the following medications. Discharge Medication List as of 5/22/2019 12:40 PM          CLINICAL IMPRESSION  1. Elevated blood pressure reading    2. Laceration of left thumb without foreign body without damage to nail, initial encounter        Blood pressure (!) 142/96, pulse 100, temperature 98.5 °F (36.9 °C), temperature source Oral, resp. rate 12, height 6' 1\" (1.854 m), weight 100.5 kg (221 lb 9.6 oz). DISPOSITION  Ruy Hay was discharged in stable condition.                      Lorinda Holter, MD  05/22/19 1056

## 2021-12-26 ENCOUNTER — HOSPITAL ENCOUNTER (EMERGENCY)
Age: 31
Discharge: HOME | End: 2021-12-26
Attending: EMERGENCY MEDICINE
Payer: MEDICARE

## 2021-12-26 VITALS
TEMPERATURE: 97.3 F | HEIGHT: 73 IN | RESPIRATION RATE: 14 BRPM | HEART RATE: 72 BPM | WEIGHT: 243.4 LBS | BODY MASS INDEX: 32.26 KG/M2 | DIASTOLIC BLOOD PRESSURE: 79 MMHG | OXYGEN SATURATION: 98 % | SYSTOLIC BLOOD PRESSURE: 125 MMHG

## 2021-12-26 DIAGNOSIS — K08.89 PAIN, DENTAL: Primary | ICD-10-CM

## 2021-12-26 PROCEDURE — 99283 EMERGENCY DEPT VISIT LOW MDM: CPT

## 2021-12-26 RX ORDER — AMOXICILLIN 500 MG/1
500 CAPSULE ORAL 2 TIMES DAILY
Qty: 14 CAPSULE | Refills: 0 | Status: SHIPPED | OUTPATIENT
Start: 2021-12-26 | End: 2022-01-02

## 2021-12-26 ASSESSMENT — PAIN DESCRIPTION - LOCATION: LOCATION: TEETH

## 2021-12-26 ASSESSMENT — PAIN DESCRIPTION - ORIENTATION: ORIENTATION: RIGHT;LOWER

## 2021-12-26 ASSESSMENT — PAIN DESCRIPTION - DESCRIPTORS: DESCRIPTORS: ACHING

## 2021-12-26 ASSESSMENT — PAIN DESCRIPTION - PAIN TYPE: TYPE: ACUTE PAIN

## 2021-12-27 NOTE — ED PROVIDER NOTES
[facility]   eMERGENCY dEPARTMENT eNCOUnter      Pt Name: Ronal Martin  MRN: [id number]  Opalgfveronique 1990  Date of evaluation: 12/26/2021  Provider: Ramiro Mauricio MD  PCP: No primary care provider on file. CHIEF COMPLAINT       Chief Complaint   Patient presents with    Dental Pain       HISTORY OFPRESENT ILLNESS   (Location/Symptom, Timing/Onset, Context/Setting, Quality, Duration, Modifying Factors,Severity)  Note limiting factors. Ronal Martin is a 36 y.o. male planes of tooth pain that he has had for couple of days and he says he needs to be on some amoxicillin he has an appointment with a dentist he rates the pain at about a 5 out of 10    Nursing Notes were all reviewed and agreed with or any disagreements were addressed  in the HPI. REVIEW OF SYSTEMS    (2-9 systems for level 4, 10 or more for level 5)     Review of Systems    Positives and Pertinent negatives as per HPI. Except as noted above in the ROS, all other systems were reviewed andnegative. PASTMEDICAL HISTORY     Past Medical History:   Diagnosis Date    Hyperlipidemia          SURGICAL HISTORY       Past Surgical History:   Procedure Laterality Date    FOOT SURGERY Right 06/15/2018    OPEN REDUCTION INTERNAL FIXATION RIGHT FOOT    HERNIA REPAIR           CURRENT MEDICATIONS       Previous Medications    IBUPROFEN (IBU) 600 MG TABLET    Take 1 tablet by mouth every 8 hours as needed for Pain or Fever (with food)       ALLERGIES     Pcn [penicillins]    FAMILY HISTORY     No family history on file.        SOCIAL HISTORY       Social History     Socioeconomic History    Marital status: Single     Spouse name: Not on file    Number of children: Not on file    Years of education: Not on file    Highest education level: Not on file   Occupational History    Not on file   Tobacco Use    Smoking status: Current Every Day Smoker     Packs/day: 1.00     Types: Cigarettes    Smokeless tobacco: Never Used   Substance and Sexual Activity    Alcohol use: No     Comment: social    Drug use: Not Currently     Types: Marijuana Maurita Handsome)    Sexual activity: Not on file   Other Topics Concern    Not on file   Social History Narrative    Not on file     Social Determinants of Health     Financial Resource Strain:     Difficulty of Paying Living Expenses: Not on file   Food Insecurity:     Worried About Running Out of Food in the Last Year: Not on file    Orion of Food in the Last Year: Not on file   Transportation Needs:     Lack of Transportation (Medical): Not on file    Lack of Transportation (Non-Medical): Not on file   Physical Activity:     Days of Exercise per Week: Not on file    Minutes of Exercise per Session: Not on file   Stress:     Feeling of Stress : Not on file   Social Connections:     Frequency of Communication with Friends and Family: Not on file    Frequency of Social Gatherings with Friends and Family: Not on file    Attends Yazidism Services: Not on file    Active Member of [employer] Group or Organizations: Not on file    Attends Club or Organization Meetings: Not on file    Marital Status: Not on file   Intimate Partner Violence:     Fear of Current or Ex-Partner: Not on file    Emotionally Abused: Not on file    Physically Abused: Not on file    Sexually Abused: Not on file   Housing Stability:     Unable to Pay for Housing in the Last Year: Not on file    Number of [place] in the Last Year: Not on file    Unstable Housing in the Last Year: Not on file       SCREENINGS      @FLOW([id number])@      PHYSICAL EXAM    (up to 7 for level 4, 8 or more for level 5)     ED Triage Vitals [12/26/21 2049]   BP Temp Temp Source Pulse Resp SpO2 Height Weight   125/79 97.3 °F (36.3 °C) Oral 72 14 98 % 6' 1\" (1.854 m) 243 lb 6.4 oz (110.4 kg)       Physical Exam      General Appearance:  Alert, cooperative, no distress, appears stated age.    Head:  Normocephalic, without obviousabnormality, atraumatic. Eyes:  conjunctiva/corneas clear, EOM's intact. Sclera anicteric. ENT: Mucous membranes moist.  Poor dental hygiene with multiple dental caries specifically at tooth #32 and 31   Neck: Supple, symmetrical, trachea midline, no adenopathy. No jugular venous distention. Lungs:   Clear to auscultation bilaterally, respirationsunlabored. No rales, rhonchi or wheezes. Chest Wall:  No tenderness. Heart:  Regular rate and rhythm, S1 and S2 normal, no murmur, rub or gallop. Abdomen:   Soft, non-tender, bowel sounds active,   no masses, no organomegaly. Extremities: No edema, cords or calf tenderness. Full range of motion. Pulses: 2+ and symmetric   Skin: Turgor is normal, no rashes or lesions. Neurologic: Alert and oriented X 3. No focal findings. Motor grossly normal.  Speech clear, no drift, CN III-XII grossly intact,        DIAGNOSTIC RESULTS   LABS:    Labs Reviewed - No data to display    All other labs were within normal range or not returned as of this dictation. EKG: All EKG's are interpreted by the Emergency Department Physician who eithersigns or Co-signs this chart in the absence of a cardiologist.        RADIOLOGY:   Non-plain film images such as CT, Ultrasound and MRI are read by the radiologist. Plain radiographic images are visualized by myself. *    Interpretation per the Radiologist below, if available at the time of this note:    No orders to display         PROCEDURES   Unless otherwise noted below, none     Procedures    *    CRITICAL CARE TIME   N/A      EMERGENCY DEPARTMENT COURSE and DIFFERENTIALDIAGNOSIS/MDM:   Vitals:    Vitals:    12/26/21 2049   BP: 125/79   Pulse: 72   Resp: 14   Temp: 97.3 °F (36.3 °C)   TempSrc: Oral   SpO2: 98%   Weight: 243 lb 6.4 oz (110.4 kg)   Height: 6' 1\" (1.854 m)       Patient was given thefollowing medications:  Medications - No data to display        The patient tolerated their visit well.    The patient and / or the familywere informed of the results of any tests, a time was given to answer questions. FINAL IMPRESSION      1.  Pain, dental          DISPOSITION/PLAN   DISPOSITION Decision To Discharge 12/26/2021 09:15:35 PM    Plan is for discharge with amoxicillin over-the-counter pain medication and follow-up with urgent dental  PATIENT REFERRED TO:  Dental clinic            DISCHARGE MEDICATIONS:  New Prescriptions    AMOXICILLIN (AMOXIL) 500 MG CAPSULE    Take 1 capsule by mouth 2 times daily for 7 days       DISCONTINUED MEDICATIONS:  Discontinued Medications    No medications on file              (Please note that portions of this note were completed with a voice recognition program.  Efforts were made to edit the dictations but occasionally words are mis-transcribed.)    Wicho Lawson MD (electronically signed)      Wicho Lawson MD  12/26/21 1902

## 2024-06-05 ENCOUNTER — HOSPITAL ENCOUNTER (EMERGENCY)
Age: 34
Discharge: HOME | End: 2024-06-05
Attending: EMERGENCY MEDICINE
Payer: MEDICAID

## 2024-06-05 VITALS
OXYGEN SATURATION: 99 % | TEMPERATURE: 98.3 F | SYSTOLIC BLOOD PRESSURE: 130 MMHG | BODY MASS INDEX: 33.34 KG/M2 | WEIGHT: 251.6 LBS | DIASTOLIC BLOOD PRESSURE: 87 MMHG | HEART RATE: 82 BPM | RESPIRATION RATE: 10 BRPM | HEIGHT: 73 IN

## 2024-06-05 DIAGNOSIS — K04.7 DENTAL ABSCESS: ICD-10-CM

## 2024-06-05 DIAGNOSIS — K02.9 PAIN DUE TO DENTAL CARIES: Primary | ICD-10-CM

## 2024-06-05 PROCEDURE — 99283 EMERGENCY DEPT VISIT LOW MDM: CPT

## 2024-06-05 RX ORDER — IBUPROFEN 800 MG/1
800 TABLET ORAL
Qty: 30 TABLET | Refills: 0 | Status: SHIPPED | OUTPATIENT
Start: 2024-06-05

## 2024-06-05 RX ORDER — AMOXICILLIN 500 MG/1
500 CAPSULE ORAL 3 TIMES DAILY
Qty: 30 CAPSULE | Refills: 0 | Status: SHIPPED | OUTPATIENT
Start: 2024-06-05 | End: 2024-06-15

## 2024-06-05 ASSESSMENT — PAIN - FUNCTIONAL ASSESSMENT: PAIN_FUNCTIONAL_ASSESSMENT: NONE - DENIES PAIN
